# Patient Record
Sex: MALE | Race: WHITE | NOT HISPANIC OR LATINO | Employment: OTHER | URBAN - METROPOLITAN AREA
[De-identification: names, ages, dates, MRNs, and addresses within clinical notes are randomized per-mention and may not be internally consistent; named-entity substitution may affect disease eponyms.]

---

## 2023-11-16 LAB — CRC RECOMMENDATION EXT: NORMAL

## 2024-03-20 ENCOUNTER — OFFICE VISIT (OUTPATIENT)
Dept: FAMILY MEDICINE | Facility: CLINIC | Age: 74
End: 2024-03-20
Payer: MEDICARE

## 2024-03-20 ENCOUNTER — LAB VISIT (OUTPATIENT)
Dept: LAB | Facility: CLINIC | Age: 74
End: 2024-03-20
Payer: MEDICARE

## 2024-03-20 VITALS
BODY MASS INDEX: 28.05 KG/M2 | HEART RATE: 66 BPM | WEIGHT: 225.63 LBS | DIASTOLIC BLOOD PRESSURE: 65 MMHG | SYSTOLIC BLOOD PRESSURE: 122 MMHG | HEIGHT: 75 IN | TEMPERATURE: 98 F | OXYGEN SATURATION: 97 %

## 2024-03-20 DIAGNOSIS — K21.9 GASTROESOPHAGEAL REFLUX DISEASE, UNSPECIFIED WHETHER ESOPHAGITIS PRESENT: ICD-10-CM

## 2024-03-20 DIAGNOSIS — E78.5 DYSLIPIDEMIA: ICD-10-CM

## 2024-03-20 DIAGNOSIS — I10 PRIMARY HYPERTENSION: ICD-10-CM

## 2024-03-20 DIAGNOSIS — E89.0 POSTOPERATIVE HYPOTHYROIDISM: ICD-10-CM

## 2024-03-20 DIAGNOSIS — E89.0 HISTORY OF THYROIDECTOMY: ICD-10-CM

## 2024-03-20 DIAGNOSIS — Z76.89 ENCOUNTER TO ESTABLISH CARE: Primary | ICD-10-CM

## 2024-03-20 DIAGNOSIS — K58.9 IRRITABLE BOWEL SYNDROME, UNSPECIFIED TYPE: ICD-10-CM

## 2024-03-20 DIAGNOSIS — Z76.89 ENCOUNTER TO ESTABLISH CARE: ICD-10-CM

## 2024-03-20 DIAGNOSIS — Z11.59 NEED FOR HEPATITIS C SCREENING TEST: ICD-10-CM

## 2024-03-20 DIAGNOSIS — R79.89 OTHER SPECIFIED ABNORMAL FINDINGS OF BLOOD CHEMISTRY: ICD-10-CM

## 2024-03-20 PROBLEM — Z90.89 HISTORY OF THYROIDECTOMY: Status: ACTIVE | Noted: 2024-03-20

## 2024-03-20 PROBLEM — Z98.890 HISTORY OF THYROIDECTOMY: Status: ACTIVE | Noted: 2024-03-20

## 2024-03-20 LAB
ALBUMIN SERPL BCP-MCNC: 3.9 G/DL (ref 3.5–5.2)
ALP SERPL-CCNC: 65 U/L (ref 55–135)
ALT SERPL W/O P-5'-P-CCNC: 20 U/L (ref 10–44)
ANION GAP SERPL CALC-SCNC: 11 MMOL/L (ref 8–16)
AST SERPL-CCNC: 20 U/L (ref 10–40)
BASOPHILS # BLD AUTO: 0.02 K/UL (ref 0–0.2)
BASOPHILS NFR BLD: 0.4 % (ref 0–1.9)
BILIRUB SERPL-MCNC: 0.5 MG/DL (ref 0.1–1)
BUN SERPL-MCNC: 18 MG/DL (ref 8–23)
CALCIUM SERPL-MCNC: 9.5 MG/DL (ref 8.7–10.5)
CHLORIDE SERPL-SCNC: 105 MMOL/L (ref 95–110)
CHOLEST SERPL-MCNC: 178 MG/DL (ref 120–199)
CHOLEST/HDLC SERPL: 4.6 {RATIO} (ref 2–5)
CO2 SERPL-SCNC: 23 MMOL/L (ref 23–29)
CREAT SERPL-MCNC: 1.3 MG/DL (ref 0.5–1.4)
DIFFERENTIAL METHOD BLD: ABNORMAL
EOSINOPHIL # BLD AUTO: 0.4 K/UL (ref 0–0.5)
EOSINOPHIL NFR BLD: 9.5 % (ref 0–8)
ERYTHROCYTE [DISTWIDTH] IN BLOOD BY AUTOMATED COUNT: 13.6 % (ref 11.5–14.5)
EST. GFR  (NO RACE VARIABLE): 57.3 ML/MIN/1.73 M^2
GLUCOSE SERPL-MCNC: 90 MG/DL (ref 70–110)
HCT VFR BLD AUTO: 38.6 % (ref 40–54)
HCV AB SERPL QL IA: NORMAL
HDLC SERPL-MCNC: 39 MG/DL (ref 40–75)
HDLC SERPL: 21.9 % (ref 20–50)
HGB BLD-MCNC: 13.7 G/DL (ref 14–18)
IMM GRANULOCYTES # BLD AUTO: 0 K/UL (ref 0–0.04)
IMM GRANULOCYTES NFR BLD AUTO: 0 % (ref 0–0.5)
LDLC SERPL CALC-MCNC: 101.4 MG/DL (ref 63–159)
LYMPHOCYTES # BLD AUTO: 0.8 K/UL (ref 1–4.8)
LYMPHOCYTES NFR BLD: 17.3 % (ref 18–48)
MCH RBC QN AUTO: 34.1 PG (ref 27–31)
MCHC RBC AUTO-ENTMCNC: 35.5 G/DL (ref 32–36)
MCV RBC AUTO: 96 FL (ref 82–98)
MONOCYTES # BLD AUTO: 0.5 K/UL (ref 0.3–1)
MONOCYTES NFR BLD: 10 % (ref 4–15)
NEUTROPHILS # BLD AUTO: 2.8 K/UL (ref 1.8–7.7)
NEUTROPHILS NFR BLD: 62.8 % (ref 38–73)
NONHDLC SERPL-MCNC: 139 MG/DL
NRBC BLD-RTO: 0 /100 WBC
PLATELET # BLD AUTO: 175 K/UL (ref 150–450)
PMV BLD AUTO: 10.2 FL (ref 9.2–12.9)
POTASSIUM SERPL-SCNC: 4 MMOL/L (ref 3.5–5.1)
PROT SERPL-MCNC: 7.5 G/DL (ref 6–8.4)
RBC # BLD AUTO: 4.02 M/UL (ref 4.6–6.2)
SODIUM SERPL-SCNC: 139 MMOL/L (ref 136–145)
TRIGL SERPL-MCNC: 188 MG/DL (ref 30–150)
TSH SERPL DL<=0.005 MIU/L-ACNC: 2.89 UIU/ML (ref 0.4–4)
WBC # BLD AUTO: 4.51 K/UL (ref 3.9–12.7)

## 2024-03-20 PROCEDURE — 36415 COLL VENOUS BLD VENIPUNCTURE: CPT | Mod: ,,, | Performed by: STUDENT IN AN ORGANIZED HEALTH CARE EDUCATION/TRAINING PROGRAM

## 2024-03-20 PROCEDURE — 80053 COMPREHEN METABOLIC PANEL: CPT | Performed by: STUDENT IN AN ORGANIZED HEALTH CARE EDUCATION/TRAINING PROGRAM

## 2024-03-20 PROCEDURE — 86803 HEPATITIS C AB TEST: CPT | Performed by: STUDENT IN AN ORGANIZED HEALTH CARE EDUCATION/TRAINING PROGRAM

## 2024-03-20 PROCEDURE — 84443 ASSAY THYROID STIM HORMONE: CPT | Performed by: STUDENT IN AN ORGANIZED HEALTH CARE EDUCATION/TRAINING PROGRAM

## 2024-03-20 PROCEDURE — 85025 COMPLETE CBC W/AUTO DIFF WBC: CPT | Performed by: STUDENT IN AN ORGANIZED HEALTH CARE EDUCATION/TRAINING PROGRAM

## 2024-03-20 PROCEDURE — 80061 LIPID PANEL: CPT | Performed by: STUDENT IN AN ORGANIZED HEALTH CARE EDUCATION/TRAINING PROGRAM

## 2024-03-20 PROCEDURE — 99214 OFFICE O/P EST MOD 30 MIN: CPT | Mod: S$GLB,,, | Performed by: STUDENT IN AN ORGANIZED HEALTH CARE EDUCATION/TRAINING PROGRAM

## 2024-03-20 RX ORDER — ESZOPICLONE 3 MG/1
TABLET, FILM COATED ORAL
COMMUNITY
Start: 2024-02-08 | End: 2024-05-30 | Stop reason: SDUPTHER

## 2024-03-20 RX ORDER — FAMOTIDINE 20 MG/1
20 TABLET, FILM COATED ORAL
COMMUNITY

## 2024-03-20 RX ORDER — OMEPRAZOLE 40 MG/1
CAPSULE, DELAYED RELEASE ORAL
COMMUNITY
Start: 2024-01-09 | End: 2024-03-20

## 2024-03-20 RX ORDER — ACETAMINOPHEN 500 MG
3 TABLET ORAL
COMMUNITY

## 2024-03-20 RX ORDER — OMEPRAZOLE 40 MG/1
40 CAPSULE, DELAYED RELEASE ORAL EVERY MORNING
Qty: 90 CAPSULE | Refills: 1 | Status: SHIPPED | OUTPATIENT
Start: 2024-03-20

## 2024-03-20 RX ORDER — BUSPIRONE HYDROCHLORIDE 10 MG/1
TABLET ORAL
COMMUNITY
Start: 2024-01-23 | End: 2024-03-20

## 2024-03-20 RX ORDER — FLUOROURACIL 50 MG/G
CREAM TOPICAL 2 TIMES DAILY
COMMUNITY
Start: 2023-11-10

## 2024-03-20 RX ORDER — LEVOTHYROXINE SODIUM 125 UG/1
CAPSULE ORAL
COMMUNITY
Start: 2023-08-01

## 2024-03-20 RX ORDER — MONTELUKAST SODIUM 4 MG/1
2 TABLET, CHEWABLE ORAL 2 TIMES DAILY
COMMUNITY
Start: 2024-01-25 | End: 2024-03-20

## 2024-03-20 NOTE — PROGRESS NOTES
"  [facility]    [facility]  [address], [address]  [place] [address]    Subjective     Patient ID: Kieran Trejo is a 73 y.o. male who comes to the clinic to establish care.    Chief Complaint: Establish Care    DLD - on statin    CAD - sees Dr. Keyla dave/ cardiology    History of Prostate Cancer - sees Dr. Tolliver, has a prostate resection in Nov 2022    HTN - on losartan-HCTZ daily    GERD - sees Dr. Ring for this, on pprilosec 40mg daily    IBS - sees Dr. Ring, this is his main complaint currently, still giving him a lot of problems    Hypothyroidism - takes levothyroxine (tirosint) 125mcg    Insomnia - takes Lunesta 10mg nightly    DANYA - on CPAP nightly    ROS negative unless stated above       Objective     Vitals:    03/20/24 0816   BP: 122/65   Pulse: 66   Temp: 97.8 °F (36.6 °C)   TempSrc: Oral   SpO2: 97%   Weight: 102.3 kg (225 lb 9.6 oz)   Height: 6' 3" (1.905 m)        Wt Readings from Last 3 Encounters:   03/20/24 0816 102.3 kg (225 lb 9.6 oz)   07/02/14 0909 103.5 kg (228 lb 1.6 oz)        Physical Exam  Vitals reviewed.   Constitutional:       Appearance: Normal appearance.   HENT:      Head: Normocephalic and atraumatic.   Eyes:      Extraocular Movements: Extraocular movements intact.      Pupils: Pupils are equal, round, and reactive to light.   Cardiovascular:      Rate and Rhythm: Normal rate.      Pulses: Normal pulses.      Heart sounds: Normal heart sounds.   Pulmonary:      Effort: Pulmonary effort is normal. No respiratory distress.      Breath sounds: Normal breath sounds.   Musculoskeletal:         General: Normal range of motion.      Cervical back: Normal range of motion and neck supple.   Skin:     General: Skin is dry.      Capillary Refill: Capillary refill takes less than 2 seconds.   Neurological:      General: No focal deficit present.      Mental Status: He is alert and oriented to person, place, and time. Mental status is at " baseline.   Psychiatric:         Mood and Affect: Mood normal.         Behavior: Behavior normal.         Thought Content: Thought content normal.         Current Outpatient Medications   Medication Instructions    eszopiclone (LUNESTA) 3 mg Tab     famotidine (PEPCID) 20 mg, Oral    fluorouraciL (EFUDEX) 5 % cream Topical (Top), 2 times daily    losartan-hydrochlorothiazide 50-12.5 mg (HYZAAR) 50-12.5 mg per tablet 1 tablet, Oral, Daily    melatonin (MELATIN) 3 mg, Sublingual    MULTIVIT-IRON-MIN-FOLIC ACID 3,500-18-0.4 UNIT-MG-MG ORAL CHEW Oral    omeprazole (PRILOSEC) 40 mg, Oral, Every morning    pravastatin (PRAVACHOL) 40 mg, Oral, Daily    TIROSINT 125 mcg Cap     VITAMIN B COMPLEX ORAL 1 tablet, Oral, Every morning           Assessment and Plan     1. Encounter to establish care  -     Comprehensive metabolic panel; Future; Expected date: 03/20/2024  -     CBC auto differential; Future; Expected date: 03/20/2024    2. Need for hepatitis C screening test  -     Hepatitis C antibody; Future; Expected date: 03/20/2024    3. Postoperative hypothyroidism  -     TSH; Future; Expected date: 03/20/2024    4. Dyslipidemia  -     Lipid panel; Future; Expected date: 03/20/2024    5. Gastroesophageal reflux disease, unspecified whether esophagitis present  -     omeprazole (PRILOSEC) 40 MG capsule; Take 1 capsule (40 mg total) by mouth every morning.  Dispense: 90 capsule; Refill: 1    6. Other specified abnormal findings of blood chemistry  -     CBC auto differential; Future; Expected date: 03/20/2024    7. Irritable bowel syndrome, unspecified type    8. History of thyroidectomy    9. Primary hypertension        Here to establish care    For hypothyroidism, continue levothyroxine 125 mcg and follow up with endocrinology     For hypertension, continue losartan and HCTZ    For dyslipidemia, continue pravastatin    For CAD, continue statin and follow up with Dr. Ramires    For GERD and IBS, continue follow-up with Dr. Ring  but may need different GI doctor in the future as he's still having stomach issues    Obtaining colonoscopy records from [facility] in 3 months         I encouraged the patient to take all medications as prescribed and to keep follow up appointments with their providers. Patient stated they had no other concerns. Questions were invited and answered. Follow up sooner if needed.     Jef Dexter MD  03/20/2024 8:11 AM       no

## 2024-03-21 ENCOUNTER — PATIENT OUTREACH (OUTPATIENT)
Dept: ADMINISTRATIVE | Facility: HOSPITAL | Age: 74
End: 2024-03-21
Payer: MEDICARE

## 2024-03-25 ENCOUNTER — PATIENT OUTREACH (OUTPATIENT)
Dept: ADMINISTRATIVE | Facility: HOSPITAL | Age: 74
End: 2024-03-25
Payer: MEDICARE

## 2024-03-25 NOTE — PROGRESS NOTES
Population Health Chart Review & Patient Outreach Details      Additional [facility] Health Notes:               Updates Requested / Reviewed:      Updated Care Coordination Note         Health Maintenance Topics Overdue:      VBHM Score: 0     Patient is not due for any topics at this time.    Influenza Vaccine  Shingles/Zoster Vaccine  RSV Vaccine                  Health Maintenance Topic(s) Outreach Outcomes & Actions Taken:    Colorectal Cancer Screening - Outreach Outcomes & Actions Taken  : External Records Uploaded, Care Team Updated, & History Updated if Applicable

## 2024-05-30 RX ORDER — ESZOPICLONE 3 MG/1
3 TABLET, FILM COATED ORAL NIGHTLY
Qty: 30 TABLET | Refills: 2 | Status: SHIPPED | OUTPATIENT
Start: 2024-05-30

## 2024-05-30 NOTE — TELEPHONE ENCOUNTER
No care due was identified.  [facility] Embedded Care Due Messages. Reference number: [id number].   5/30/2024 2:39:46 PM CDT

## 2024-05-30 NOTE — TELEPHONE ENCOUNTER
----- Message from Trice Su sent at 5/30/2024  2:27 PM CDT -----  Contact: PT  Type:  RX Refill Request    Who Called: PT  Refill or New Rx:REFILL   RX Name and Strength:eszopiclone (LUNESTA) 3 mg Tab  How is the patient currently taking it? (ex. 1XDay):ONE TAB PER NIGHT   Is this a 30 day or 90 day RX:90  Preferred Pharmacy with phone number:   [id number] - [facility] [address] [facility]  [facility]  [address]  [facility] [address]  Phone: [phone] Fax: [phone]  Local or Mail Order:LOCAL   Ordering Provider:DERIAN  Would the patient rather a call back or a response via [facility]? CALL  Best Call Back Number:[phone] (home)   Additional Information: THANK YOU

## 2024-06-02 ENCOUNTER — NURSE TRIAGE (OUTPATIENT)
Dept: ADMINISTRATIVE | Facility: CLINIC | Age: 74
End: 2024-06-02
Payer: MEDICARE

## 2024-06-02 NOTE — TELEPHONE ENCOUNTER
[place]    PCP:  Dr. Jef Dexter    Pt reports that he took 2 Covid home tests and both were +.  C/O severe body aches, chills, low-grade temp to fever (98.2), productive cough with clear phlegm with occasional mucus, HA, stiff neck (can touch his chin to his chest), and PND.  He reports just returned from a [place].  Denies SOB and CP.  H/O HTN, Hypothyroidism S/P Thyroidectomy, GERD, and IBS.  Per protocol, care advised is call PCP within 24 hrs or TM within 24 hrs.  Advised call PCP tomorrow/VV/UCC/ED.  Pt reports that today is his 5th day of symptoms so he needs to be seen today.  Pt states no UCC so he'd have to go to the ED.  Advised he can do VV with [facility] today.  Pt agreeable.  [facility] instruction provided and assisted pt with registering for [facility] VV.  [facility] states no Provider available at this time.  Advised pt to check back later or go to UCC/ED - NT did advise pt of UCC open now near him and hours provided.  Pt VU.  Advised to call for worsening/questions/concerns.  VU.    Reason for Disposition   [1] HIGH RISK patient (e.g., weak immune system, age > 64 years, obesity with BMI 30 or higher, pregnant, chronic lung disease or other chronic medical condition) AND [2] COVID symptoms (e.g., cough, fever)  (Exceptions: Already seen by PCP and no new or worsening symptoms.)    Additional Information   Negative: SEVERE difficulty breathing (e.g., struggling for each breath, speaks in single words)   Negative: Difficult to awaken or acting confused (e.g., disoriented, slurred speech)   Negative: Bluish (or gray) lips or face now   Negative: Shock suspected (e.g., cold/pale/clammy skin, too weak to stand, low BP, rapid pulse)   Negative: Sounds like a life-threatening emergency to the triager   Negative: SEVERE or constant chest pain or pressure  (Exception: Mild central chest pain, present only when coughing.)   Negative: MODERATE difficulty breathing (e.g., speaks in phrases, SOB even at rest,  pulse 100-120)   Negative: [1] Headache AND [2] stiff neck (can't touch chin to chest)   Negative: Chest pain or pressure  (Exception: MILD central chest pain, present only when coughing.)   Negative: [1] Drinking very little AND [2] dehydration suspected (e.g., no urine > 12 hours, very dry mouth, very lightheaded)   Negative: Patient sounds very sick or weak to the triager   Negative: MILD difficulty breathing (e.g., minimal/no SOB at rest, SOB with walking, pulse <100)   Negative: Fever > 103 F (39.4 C)   Negative: [1] Fever > 101 F (38.3 C) AND [2] age > 60 years   Negative: [1] Fever > 100.0 F (37.8 C) AND [2] bedridden (e.g., CVA, chronic illness, recovering from surgery)    Protocols used: Coronavirus (COVID-19) Diagnosed or [facility]

## 2024-06-04 NOTE — TELEPHONE ENCOUNTER
Called patient to follow up with him on how he's feeling he is taking his tx for Covid a 5 day tx I scheduled him a f/u for Monday Keysha 10 @ 800am w/ Dr Dexter patient agreed and I told him if he has any concerns to please reach out to us .

## 2024-06-07 ENCOUNTER — PATIENT MESSAGE (OUTPATIENT)
Dept: FAMILY MEDICINE | Facility: CLINIC | Age: 74
End: 2024-06-07
Payer: MEDICARE

## 2024-06-10 ENCOUNTER — OFFICE VISIT (OUTPATIENT)
Dept: FAMILY MEDICINE | Facility: CLINIC | Age: 74
End: 2024-06-10
Payer: MEDICARE

## 2024-06-10 VITALS
HEART RATE: 57 BPM | WEIGHT: 217.88 LBS | HEIGHT: 75 IN | DIASTOLIC BLOOD PRESSURE: 70 MMHG | SYSTOLIC BLOOD PRESSURE: 104 MMHG | BODY MASS INDEX: 27.09 KG/M2 | OXYGEN SATURATION: 99 %

## 2024-06-10 DIAGNOSIS — E78.5 DYSLIPIDEMIA: ICD-10-CM

## 2024-06-10 DIAGNOSIS — U07.1 COVID-19: ICD-10-CM

## 2024-06-10 DIAGNOSIS — I10 PRIMARY HYPERTENSION: Primary | ICD-10-CM

## 2024-06-10 PROCEDURE — 99214 OFFICE O/P EST MOD 30 MIN: CPT | Mod: S$GLB,,, | Performed by: STUDENT IN AN ORGANIZED HEALTH CARE EDUCATION/TRAINING PROGRAM

## 2024-06-10 RX ORDER — BUSPIRONE HYDROCHLORIDE 10 MG/1
20 TABLET ORAL 2 TIMES DAILY
COMMUNITY
Start: 2024-01-23 | End: 2024-10-19

## 2024-06-10 RX ORDER — HYDROGEN PEROXIDE 3 %
40 SOLUTION, NON-ORAL MISCELLANEOUS DAILY
COMMUNITY

## 2024-06-10 NOTE — PROGRESS NOTES
"  [facility]  [address], [address]  [place] [address]    Subjective     Patient ID: Kieran Trejo is a 73 y.o. male who comes to the clinic for an acute visit.    Chief Complaint: Follow-up    Covid positive - tested positive for covid about 8 days ago. Had a virtual visit w/ a doctor a few days ago, was prescribed paxlovid. Had SE's such as headache. Finished his course but didn't take the last day. Feeling better now, no SOB.     ROS negative unless stated above       Objective     Vitals:    06/10/24 0814   BP: 104/70   BP Location: Left arm   Patient Position: Sitting   BP Method: Large (Manual)   Pulse: (!) 57   SpO2: 99%   Weight: 98.8 kg (217 lb 14.4 oz)   Height: 6' 3" (1.905 m)       Wt Readings from Last 3 Encounters:   06/10/24 0814 98.8 kg (217 lb 14.4 oz)   03/20/24 0816 102.3 kg (225 lb 9.6 oz)   07/02/14 0909 103.5 kg (228 lb 1.6 oz)        Physical Exam  Vitals reviewed.   Constitutional:       Appearance: Normal appearance.   HENT:      Head: Normocephalic and atraumatic.   Eyes:      Extraocular Movements: Extraocular movements intact.      Pupils: Pupils are equal, round, and reactive to light.   Cardiovascular:      Rate and Rhythm: Normal rate.      Pulses: Normal pulses.      Heart sounds: Normal heart sounds.   Pulmonary:      Effort: Pulmonary effort is normal. No respiratory distress.      Breath sounds: Normal breath sounds.   Musculoskeletal:         General: Normal range of motion.      Cervical back: Normal range of motion and neck supple.   Skin:     General: Skin is dry.      Capillary Refill: Capillary refill takes less than 2 seconds.   Neurological:      General: No focal deficit present.      Mental Status: He is alert and oriented to person, place, and time. Mental status is at baseline.   Psychiatric:         Mood and Affect: Mood normal.         Behavior: Behavior normal.         Thought Content: Thought content " normal.         Current Outpatient Medications   Medication Instructions    busPIRone (BUSPAR) 20 mg, Oral, 2 times daily    esomeprazole (NEXIUM) 40 mg, Oral, Daily    eszopiclone (LUNESTA) 3 mg, Oral, Nightly    famotidine (PEPCID) 20 mg, Oral    fluorouraciL (EFUDEX) 5 % cream Topical (Top), 2 times daily    losartan-hydrochlorothiazide 50-12.5 mg (HYZAAR) 50-12.5 mg per tablet 1 tablet, Oral, Daily    melatonin (MELATIN) 3 mg, Sublingual    MULTIVIT-IRON-MIN-FOLIC ACID 3,500-18-0.4 UNIT-MG-MG ORAL CHEW Oral    omeprazole (PRILOSEC) 40 mg, Oral, Every morning    pravastatin (PRAVACHOL) 40 mg, Oral, Daily    TIROSINT 125 mcg Cap     VITAMIN B COMPLEX ORAL 1 tablet, Oral, Every morning           Assessment and Plan     1. Primary hypertension    2. Dyslipidemia    3. COVID-19      Here for an acute visit    For HTN, continue BP regimen as above    For DLD, continue statin, at goal    For covid 19, he's about 14 days out, finished paxlovid. Was told to f/u w/ his PCP to make sure he's doing better. He is. Continue multivitamin w/ immune support, will let me know if symptoms return    RTC in 6 months         I encouraged the patient to take all medications as prescribed and to keep follow up appointments with their providers. Patient stated they had no other concerns. Questions were invited and answered. Follow up sooner if need. ED precautions given.    Jef Dexter MD  06/10/2024 8:18 AM

## 2024-06-12 ENCOUNTER — TELEPHONE (OUTPATIENT)
Dept: FAMILY MEDICINE | Facility: CLINIC | Age: 74
End: 2024-06-12
Payer: MEDICARE

## 2024-06-12 DIAGNOSIS — I10 PRIMARY HYPERTENSION: Primary | ICD-10-CM

## 2024-06-12 RX ORDER — LOSARTAN POTASSIUM AND HYDROCHLOROTHIAZIDE 12.5; 5 MG/1; MG/1
1 TABLET ORAL DAILY
Qty: 90 TABLET | Refills: 3 | Status: SHIPPED | OUTPATIENT
Start: 2024-06-12

## 2024-06-12 NOTE — TELEPHONE ENCOUNTER
----- Message from Jagdeep Uribe MA sent at 6/12/2024 10:08 AM CDT -----  Contact: PT  Patient Requesting medication Refill please Advice.  ----- Message -----  From: Trice Su  Sent: 6/12/2024   9:57 AM CDT  To: Derian Fuchs Staff    Type:  RX Refill Request    Who Called: PT  Refill or New Rx: NEW  RX Name and Strength: losartan-hydrochlorothiazide 50-12.5 mg (HYZAAR) 50-12.5 mg per tablet  How is the patient currently taking it? (ex. 1XDay):ONE TAB PER DAY  Is this a 30 day or 90 day RX:90  Preferred Pharmacy with phone number:   [id number] [place], [place] - [address] [facility]  [facility]  [address]  [facility] [address]  Phone: [phone] Fax: [phone]  Local or Mail Order:LOCAL  Ordering Provider:DERIAN  Would the patient rather a call back or a response via [facility]? CALL   Best Call Back Number:[phone] (home)   Additional Information: THANK YOU

## 2024-06-13 ENCOUNTER — TELEPHONE (OUTPATIENT)
Dept: FAMILY MEDICINE | Facility: CLINIC | Age: 74
End: 2024-06-13

## 2024-06-13 ENCOUNTER — OFFICE VISIT (OUTPATIENT)
Dept: FAMILY MEDICINE | Facility: CLINIC | Age: 74
End: 2024-06-13
Payer: MEDICARE

## 2024-06-13 DIAGNOSIS — B96.89 BACTERIAL SINUSITIS: Primary | ICD-10-CM

## 2024-06-13 DIAGNOSIS — J32.9 BACTERIAL SINUSITIS: Primary | ICD-10-CM

## 2024-06-13 DIAGNOSIS — U07.1 COVID-19: ICD-10-CM

## 2024-06-13 PROCEDURE — 99214 OFFICE O/P EST MOD 30 MIN: CPT | Mod: S$GLB,,, | Performed by: STUDENT IN AN ORGANIZED HEALTH CARE EDUCATION/TRAINING PROGRAM

## 2024-06-13 RX ORDER — AMOXICILLIN 500 MG/1
500 TABLET, FILM COATED ORAL EVERY 12 HOURS
Qty: 14 TABLET | Refills: 0 | Status: SHIPPED | OUTPATIENT
Start: 2024-06-13 | End: 2024-06-20

## 2024-06-13 RX ORDER — PREDNISONE 20 MG/1
20 TABLET ORAL DAILY
Qty: 5 TABLET | Refills: 0 | Status: SHIPPED | OUTPATIENT
Start: 2024-06-13

## 2024-06-13 NOTE — PROGRESS NOTES
"  [facility]  [address]  [place] [address]    Subjective     Patient ID: Kieran Trejo is a 73 y.o. male who comes to the clinic for an acute visit.    Chief Complaint: Follow-up (Pt is +Covid)    Covid positive - symptoms started about 17 days, I just saw him this week for the same thing. Symptoms have progressed throughout the week. Main thing is fatigue and sinus congestion. No SOB or fever    ROS negative unless stated above       Objective     Vitals:    06/13/24 1631   BP: (P) 130/74   Pulse: (P) 68   SpO2: (P) 97%   Weight: (P) 98 kg (216 lb)   Height: (P) 6' 3" (1.905 m)       Wt Readings from Last 3 Encounters:   06/13/24 1631 (P) 98 kg (216 lb)   06/10/24 0814 98.8 kg (217 lb 14.4 oz)   03/20/24 0816 102.3 kg (225 lb 9.6 oz)        Physical Exam  Vitals reviewed.   Constitutional:       Appearance: Normal appearance.   HENT:      Head: Normocephalic and atraumatic.   Eyes:      Extraocular Movements: Extraocular movements intact.      Pupils: Pupils are equal, round, and reactive to light.   Cardiovascular:      Rate and Rhythm: Normal rate.      Pulses: Normal pulses.      Heart sounds: Normal heart sounds.   Pulmonary:      Effort: Pulmonary effort is normal. No respiratory distress.      Breath sounds: Normal breath sounds.   Musculoskeletal:         General: Normal range of motion.      Cervical back: Normal range of motion and neck supple.   Skin:     General: Skin is dry.      Capillary Refill: Capillary refill takes less than 2 seconds.   Neurological:      General: No focal deficit present.      Mental Status: He is alert and oriented to person, place, and time. Mental status is at baseline.   Psychiatric:         Mood and Affect: Mood normal.         Behavior: Behavior normal.         Thought Content: Thought content normal.         Current Outpatient Medications   Medication Instructions    busPIRone (BUSPAR) 20 mg, " Oral, 2 times daily    esomeprazole (NEXIUM) 40 mg, Oral, Daily    eszopiclone (LUNESTA) 3 mg, Oral, Nightly    famotidine (PEPCID) 20 mg, Oral    fluorouraciL (EFUDEX) 5 % cream Topical (Top), 2 times daily    losartan-hydrochlorothiazide 50-12.5 mg (HYZAAR) 50-12.5 mg per tablet 1 tablet, Oral, Daily    melatonin (MELATIN) 3 mg, Sublingual    MULTIVIT-IRON-MIN-FOLIC ACID 3,500-18-0.4 UNIT-MG-MG ORAL CHEW Oral    omeprazole (PRILOSEC) 40 mg, Oral, Every morning    pravastatin (PRAVACHOL) 40 mg, Oral, Daily    TIROSINT 125 mcg Cap     VITAMIN B COMPLEX ORAL 1 tablet, Oral, Every morning           Assessment and Plan     1. Bacterial sinusitis    2. COVID-19        Here for an acute visit    Could have a bacterial superficial infection given worsening symptoms. Will treat w/ amoxil, went over SE's. Treating w/ prednisone 20mg daily, went over SE's    RTC as needed         I encouraged the patient to take all medications as prescribed and to keep follow up appointments with their providers. Patient stated they had no other concerns. Questions were invited and answered. Follow up sooner if need. ED precautions given.    Jef Dexter MD  06/13/2024 4:36 PM

## 2024-06-13 NOTE — TELEPHONE ENCOUNTER
----- Message from Madonna Rangel sent at 6/13/2024 10:50 AM CDT -----  Contact: PT  Type: Needs Medical Advice    Who Called: PT  Best Call Back Number: [phone]  Additional  Information:  PT calling to report he tested positive for Covid, today. Still coming in for 4;30 appt today, told pt he would need to wear a mask. Pt verbalized understanding  Please Advise- Thank you

## 2024-07-16 ENCOUNTER — TELEPHONE (OUTPATIENT)
Dept: FAMILY MEDICINE | Facility: CLINIC | Age: 74
End: 2024-07-16
Payer: MEDICARE

## 2024-07-16 NOTE — TELEPHONE ENCOUNTER
----- Message from Tim Parson sent at 7/16/2024 10:19 AM CDT -----  Regarding: clearnace  Type:  Needs Medical Advice    Who Called: Pt    Would the patient rather a call back or a response via [facility]? Call back    Best Call Back Number: [phone]      Additional Information: Sts Dr Santos faxed over clearance for a procedure yesterday and wants to know if it was received and what he needs to do to get it cleared so he can have his procedure.   Please advise -- Thank you

## 2024-07-22 ENCOUNTER — TELEPHONE (OUTPATIENT)
Dept: FAMILY MEDICINE | Facility: CLINIC | Age: 74
End: 2024-07-22
Payer: MEDICARE

## 2024-07-22 NOTE — TELEPHONE ENCOUNTER
Duplicate message spoke with patient scheduled appointment with Dr. Dexter for 0900 7/23/2023.  ----- Message from Delia Mahmood sent at 7/22/2024 11:08 AM CDT -----  Contact: Patient  Type:  Patient Returning Call    Who Called:Patient     Who Left Message for Patient:Christiana    Does the patient know what this is regarding?:Yes    Would the patient rather a call back or a response via [facility]? Call    Best Call Back Number: [phone]    Additional Information: Patient states that the wrong number was called for him and is requesting a call to his cell

## 2024-07-22 NOTE — TELEPHONE ENCOUNTER
----- Message from Delia Ferreira MA sent at 7/22/2024 10:16 AM CDT -----  Contact: Patient  Please advise  ----- Message -----  From: Delia Mahmood  Sent: 7/22/2024  10:14 AM CDT  To: Isacc Fuchs Staff    Type:  Patient Returning Call    Who Called:Patient     Who Left Message for Patient:Christiana    Does the patient know what this is regarding?:Yes    Would the patient rather a call back or a response via [facility]? Call    Best Call Back Number:   [phone]    Additional Information: Please return call

## 2024-07-22 NOTE — TELEPHONE ENCOUNTER
Dra Dexter and Dr. Dexter staff,  Patient is calling to see if pre-op forms were received for this patient from Dr. Santos's office for scheduled cataract surgery scheduled for 8/6/2024. This patient is schedule for pre-op for tomorrow 7/23/2024 @ 0900 AM.  Please review and advise.  Thank you.  Signed:  Pedro Cao LPN  ----- Message from Trice Su sent at 7/22/2024 10:55 AM CDT -----  Contact: PT  Type:  Patient Returning Call    Who Called:PT   Who Left Message for Patient:PEDRO  Does the patient know what this is regarding?:APPT - PRE OP   Would the patient rather a call back or a response via [facility]? CALL   Best Call Back Number: [phone] (home)  Additional Information: THANK YOU

## 2024-07-22 NOTE — TELEPHONE ENCOUNTER
----- Message from Trice Su sent at 7/22/2024  8:58 AM CDT -----  Contact: PT  Type:  Needs Medical Advice    Who Called: PT   Symptoms (please be specific): PLEASE CALL TO DISCUSS  IF A CLEARANCE LETTER IS NEEDED FOR A   08/06/24 PROCEDURE  How long has patient had these symptoms:  N/A  Pharmacy name and phone #:  N/A   Would the patient rather a call back or a response via [facility]? CALL   Best Call Back Number: [phone] (home)   Additional Information: THANK YOU

## 2024-07-23 ENCOUNTER — OFFICE VISIT (OUTPATIENT)
Dept: FAMILY MEDICINE | Facility: CLINIC | Age: 74
End: 2024-07-23
Payer: MEDICARE

## 2024-07-23 VITALS
OXYGEN SATURATION: 99 % | DIASTOLIC BLOOD PRESSURE: 70 MMHG | HEART RATE: 56 BPM | HEIGHT: 75 IN | SYSTOLIC BLOOD PRESSURE: 134 MMHG | BODY MASS INDEX: 26.81 KG/M2 | WEIGHT: 215.63 LBS

## 2024-07-23 DIAGNOSIS — Z01.818 PREOP EXAMINATION: Primary | ICD-10-CM

## 2024-07-23 PROCEDURE — G2211 COMPLEX E/M VISIT ADD ON: HCPCS | Mod: S$GLB,,, | Performed by: STUDENT IN AN ORGANIZED HEALTH CARE EDUCATION/TRAINING PROGRAM

## 2024-07-23 PROCEDURE — 99214 OFFICE O/P EST MOD 30 MIN: CPT | Mod: S$GLB,,, | Performed by: STUDENT IN AN ORGANIZED HEALTH CARE EDUCATION/TRAINING PROGRAM

## 2024-07-23 PROCEDURE — 93005 ELECTROCARDIOGRAM TRACING: CPT | Mod: S$GLB,,, | Performed by: STUDENT IN AN ORGANIZED HEALTH CARE EDUCATION/TRAINING PROGRAM

## 2024-07-23 NOTE — LETTER
July 23, 2024    Kieran Trejo  [address] [place] [address] [place] [address]             [facility]  [address] [address]  Phone: [phone] To Whom It May Concern,    Patient has been informed of the risks of surgery (including infection, bleeding, blood clots/PE, stroke, heart attack, dependence of ventilator, and even death). Patient understands these risks and would like to proceed w/ surgery.        If you have any questions or concerns, please don't hesitate to call.    Sincerely,        Jef Dexter MD

## 2024-07-23 NOTE — PROGRESS NOTES
"  [facility]  [address], [address]  [place] [address]    Subjective     Patient ID: Kieran Trejo is a 73 y.o. male who comes to the clinic for an acute visit.    Chief Complaint: Cataract (Patient is here for a letter for Dr. Santos for cataract surgery on 08/06/2024)    Patient needs preop clearance for cataract surgery. Currently no SOB or CP. No recent respirator infection. Planned for August 6th w/ left eye.     ROS negative unless stated above       Objective     Vitals:    07/23/24 0901   BP: 134/70   Pulse: (!) 56   SpO2: 99%   Weight: 97.8 kg (215 lb 9.6 oz)   Height: 6' 3" (1.905 m)       Wt Readings from Last 3 Encounters:   07/23/24 0901 97.8 kg (215 lb 9.6 oz)   06/13/24 1631 (P) 98 kg (216 lb)   06/10/24 0814 98.8 kg (217 lb 14.4 oz)        Physical Exam  Vitals reviewed.   Constitutional:       Appearance: Normal appearance.   HENT:      Head: Normocephalic and atraumatic.   Eyes:      Extraocular Movements: Extraocular movements intact.      Pupils: Pupils are equal, round, and reactive to light.   Cardiovascular:      Rate and Rhythm: Normal rate.      Pulses: Normal pulses.      Heart sounds: Normal heart sounds.   Pulmonary:      Effort: Pulmonary effort is normal. No respiratory distress.      Breath sounds: Normal breath sounds.   Musculoskeletal:         General: Normal range of motion.      Cervical back: Normal range of motion and neck supple.   Skin:     General: Skin is dry.      Capillary Refill: Capillary refill takes less than 2 seconds.   Neurological:      General: No focal deficit present.      Mental Status: He is alert and oriented to person, place, and time. Mental status is at baseline.   Psychiatric:         Mood and Affect: Mood normal.         Behavior: Behavior normal.         Thought Content: Thought content normal.         Current Outpatient Medications   Medication Instructions    busPIRone (BUSPAR) 20 mg, " Oral, 2 times daily    esomeprazole (NEXIUM) 40 mg, Oral, Daily    eszopiclone (LUNESTA) 3 mg, Oral, Nightly    famotidine (PEPCID) 20 mg, Oral    losartan-hydrochlorothiazide 50-12.5 mg (HYZAAR) 50-12.5 mg per tablet 1 tablet, Oral, Daily    melatonin (MELATIN) 3 mg, Sublingual    MULTIVIT-IRON-MIN-FOLIC ACID 3,500-18-0.4 UNIT-MG-MG ORAL CHEW Oral    omeprazole (PRILOSEC) 40 mg, Oral, Every morning    pravastatin (PRAVACHOL) 40 mg, Oral, Daily    TIROSINT 125 mcg Cap     VITAMIN B COMPLEX ORAL 1 tablet, Oral, Every morning           Assessment and Plan     1. Preop examination  -     IN OFFICE EKG 12-LEAD (to Muse)      Here for an acute visit    Patient was told the risks of surgery which includes infection, blood clots, bleeding, heart attack, stroke, and even death among others. She understands the risks and would like to proceed w/ surgery.    RTC at end of year         I encouraged the patient to take all medications as prescribed and to keep follow up appointments with their providers. Patient stated they had no other concerns. Questions were invited and answered. Follow up sooner if need. ED precautions given.    Jef Dexter MD  07/23/2024 9:04 AM

## 2024-07-24 LAB
OHS QRS DURATION: 118 MS
OHS QTC CALCULATION: 404 MS

## 2024-12-05 DIAGNOSIS — G47.00 INSOMNIA, UNSPECIFIED TYPE: Primary | ICD-10-CM

## 2024-12-05 RX ORDER — ESZOPICLONE 3 MG/1
3 TABLET, FILM COATED ORAL NIGHTLY
Qty: 30 TABLET | Refills: 2 | Status: SHIPPED | OUTPATIENT
Start: 2024-12-05

## 2024-12-05 NOTE — TELEPHONE ENCOUNTER
Refill Routing Note   Medication(s) are not appropriate for processing by [facility] for the following reason(s):        Outside of protocol    [facility] action(s):  Route             Appointments  past 12m or future 3m with PCP    Date Provider   Last Visit   7/23/2024 Jef Dexter MD   Next Visit   12/10/2024 Jef Dexter MD   ED visits in past 90 days: 0        Note composed:1:02 PM 12/05/2024

## 2024-12-05 NOTE — TELEPHONE ENCOUNTER
No care due was identified.  [facility] Embedded Care Due Messages. Reference number: [id number].   12/05/2024 12:29:16 PM CST

## 2024-12-10 ENCOUNTER — OFFICE VISIT (OUTPATIENT)
Dept: FAMILY MEDICINE | Facility: CLINIC | Age: 74
End: 2024-12-10
Payer: MEDICARE

## 2024-12-10 VITALS
WEIGHT: 218 LBS | RESPIRATION RATE: 18 BRPM | HEART RATE: 66 BPM | SYSTOLIC BLOOD PRESSURE: 120 MMHG | OXYGEN SATURATION: 96 % | BODY MASS INDEX: 27.1 KG/M2 | HEIGHT: 75 IN | DIASTOLIC BLOOD PRESSURE: 70 MMHG

## 2024-12-10 DIAGNOSIS — E78.2 MIXED HYPERLIPIDEMIA: ICD-10-CM

## 2024-12-10 DIAGNOSIS — E89.0 POSTOPERATIVE HYPOTHYROIDISM: ICD-10-CM

## 2024-12-10 DIAGNOSIS — I10 PRIMARY HYPERTENSION: Primary | ICD-10-CM

## 2024-12-10 PROCEDURE — 99214 OFFICE O/P EST MOD 30 MIN: CPT | Mod: S$GLB,,, | Performed by: STUDENT IN AN ORGANIZED HEALTH CARE EDUCATION/TRAINING PROGRAM

## 2024-12-10 PROCEDURE — G2211 COMPLEX E/M VISIT ADD ON: HCPCS | Mod: S$GLB,,, | Performed by: STUDENT IN AN ORGANIZED HEALTH CARE EDUCATION/TRAINING PROGRAM

## 2024-12-10 NOTE — PROGRESS NOTES
"  [facility]    [facility]  [address]  [place] [address]    Subjective     Patient ID: Kieran Trejo is a 74 y.o. male who comes to the clinic for a follow up visit.    Chief Complaint: Follow-up (6 mos f/u)    DLD - on statin     CAD - sees Dr. Ramires w/ cardiology    History of Prostate Cancer - sees Dr. Tolliver, has a prostate resection in Nov 2022    HTN - on losartan-HCTZ daily    GERD - sees Dr. Ring for this, on pprilosec 40mg daily     IBS - sees Dr. 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 They think it could be his back    Basal Cell Skin Cancer - s/p removal from his right arm in 2024    ROS negative unless stated above       Objective     Vitals:    12/10/24 1535   BP: 120/70   BP Location: Left arm   Patient Position: Sitting   Pulse: 66   Resp: 18   SpO2: 96%   Weight: 98.9 kg (218 lb)   Height: 6' 3" (1.905 m)        Wt Readings from Last 3 Encounters:   12/10/24 1535 98.9 kg (218 lb)   07/23/24 0901 97.8 kg (215 lb 9.6 oz)   06/13/24 1631 (P) 98 kg (216 lb)        Physical Exam  Constitutional:       General: He is not in acute distress.     Appearance: Normal appearance. He is not ill-appearing.   HENT:      Head: Normocephalic and atraumatic.      Mouth/Throat:      Mouth: Mucous membranes are moist.   Eyes:      Extraocular Movements: Extraocular movements intact.      Pupils: Pupils are equal, round, and reactive to light.   Cardiovascular:      Rate and Rhythm: Normal rate.   Pulmonary:      Effort: Pulmonary effort is normal. No respiratory distress.   Musculoskeletal:         General: Normal range of motion.      Cervical back: Normal range of motion and neck supple. "   Skin:     General: Skin is warm and dry.   Neurological:      General: No focal deficit present.      Mental Status: He is alert and oriented to person, place, and time. Mental status is at baseline.   Psychiatric:         Mood and Affect: Mood normal.         Behavior: Behavior normal.         Thought Content: Thought content normal.         Current Outpatient Medications   Medication Instructions    eszopiclone (LUNESTA) 3 mg, Oral, Nightly    famotidine (PEPCID) 20 mg    losartan-hydrochlorothiazide 50-12.5 mg (HYZAAR) 50-12.5 mg per tablet 1 tablet, Oral, Daily    melatonin (MELATIN) 3 mg    MULTIVIT-IRON-MIN-FOLIC ACID 3,500-18-0.4 UNIT-MG-MG ORAL CHEW Take by mouth.    omeprazole (PRILOSEC) 40 mg, Oral, Every morning    pravastatin (PRAVACHOL) 40 mg, Daily    TIROSINT 125 mcg Cap     VITAMIN B COMPLEX ORAL 1 tablet, Every morning           Assessment and Plan     1. Primary hypertension  -     Basic metabolic panel; Future; Expected date: 12/10/2024  -     CBC auto differential; Future; Expected date: 12/10/2024    2. Postoperative hypothyroidism  -     TSH; Future; Expected date: 12/10/2024    3. Mixed hyperlipidemia  -     Lipid panel; Future; Expected date: 12/10/2024        Here for follow up.    For hypothyroidism, continue levothyroxine 125 mcg and follow up with endocrinology      For hypertension, continue losartan and HCTZ     For dyslipidemia, continue pravastatin     For CAD, continue statin and follow up with Dr. Ramires    The visit today included increased complexity associated with the care of an episodic problem, namely HTN, that was addressed and managed via longitudinal care.    RTC in 6 months        I encouraged the patient to take all medications as prescribed and to keep follow up appointments with their providers. ED precautions given for more concerning issues. Questions were invited and answered. Patient stated they had no other concerns. Follow up sooner if needed.     Jef  MD Isacc  12/10/2024 3:42 PM

## 2024-12-11 ENCOUNTER — LAB VISIT (OUTPATIENT)
Dept: LAB | Facility: CLINIC | Age: 74
End: 2024-12-11
Payer: MEDICARE

## 2024-12-11 DIAGNOSIS — E89.0 POSTOPERATIVE HYPOTHYROIDISM: ICD-10-CM

## 2024-12-11 DIAGNOSIS — E78.2 MIXED HYPERLIPIDEMIA: ICD-10-CM

## 2024-12-11 DIAGNOSIS — I10 PRIMARY HYPERTENSION: ICD-10-CM

## 2024-12-11 LAB
ANION GAP SERPL CALC-SCNC: 10 MMOL/L (ref 8–16)
BASOPHILS # BLD AUTO: 0.02 K/UL (ref 0–0.2)
BASOPHILS NFR BLD: 0.4 % (ref 0–1.9)
BUN SERPL-MCNC: 19 MG/DL (ref 8–23)
CALCIUM SERPL-MCNC: 9.3 MG/DL (ref 8.7–10.5)
CHLORIDE SERPL-SCNC: 109 MMOL/L (ref 95–110)
CHOLEST SERPL-MCNC: 175 MG/DL (ref 120–199)
CHOLEST/HDLC SERPL: 4.3 {RATIO} (ref 2–5)
CO2 SERPL-SCNC: 23 MMOL/L (ref 23–29)
CREAT SERPL-MCNC: 1.2 MG/DL (ref 0.5–1.4)
DIFFERENTIAL METHOD BLD: ABNORMAL
EOSINOPHIL # BLD AUTO: 0.5 K/UL (ref 0–0.5)
EOSINOPHIL NFR BLD: 9.9 % (ref 0–8)
ERYTHROCYTE [DISTWIDTH] IN BLOOD BY AUTOMATED COUNT: 13.7 % (ref 11.5–14.5)
EST. GFR  (NO RACE VARIABLE): >60 ML/MIN/1.73 M^2
GLUCOSE SERPL-MCNC: 117 MG/DL (ref 70–110)
HCT VFR BLD AUTO: 39.1 % (ref 40–54)
HDLC SERPL-MCNC: 41 MG/DL (ref 40–75)
HDLC SERPL: 23.4 % (ref 20–50)
HGB BLD-MCNC: 13.7 G/DL (ref 14–18)
IMM GRANULOCYTES # BLD AUTO: 0.01 K/UL (ref 0–0.04)
IMM GRANULOCYTES NFR BLD AUTO: 0.2 % (ref 0–0.5)
LDLC SERPL CALC-MCNC: 111 MG/DL (ref 63–159)
LYMPHOCYTES # BLD AUTO: 1.1 K/UL (ref 1–4.8)
LYMPHOCYTES NFR BLD: 20.9 % (ref 18–48)
MCH RBC QN AUTO: 33.7 PG (ref 27–31)
MCHC RBC AUTO-ENTMCNC: 35 G/DL (ref 32–36)
MCV RBC AUTO: 96 FL (ref 82–98)
MONOCYTES # BLD AUTO: 0.5 K/UL (ref 0.3–1)
MONOCYTES NFR BLD: 8.5 % (ref 4–15)
NEUTROPHILS # BLD AUTO: 3.2 K/UL (ref 1.8–7.7)
NEUTROPHILS NFR BLD: 60.1 % (ref 38–73)
NONHDLC SERPL-MCNC: 134 MG/DL
NRBC BLD-RTO: 0 /100 WBC
PLATELET # BLD AUTO: 171 K/UL (ref 150–450)
PMV BLD AUTO: 10 FL (ref 9.2–12.9)
POTASSIUM SERPL-SCNC: 4.1 MMOL/L (ref 3.5–5.1)
RBC # BLD AUTO: 4.06 M/UL (ref 4.6–6.2)
SODIUM SERPL-SCNC: 142 MMOL/L (ref 136–145)
TRIGL SERPL-MCNC: 115 MG/DL (ref 30–150)
TSH SERPL DL<=0.005 MIU/L-ACNC: 3.02 UIU/ML (ref 0.4–4)
WBC # BLD AUTO: 5.27 K/UL (ref 3.9–12.7)

## 2024-12-11 PROCEDURE — 84443 ASSAY THYROID STIM HORMONE: CPT | Performed by: STUDENT IN AN ORGANIZED HEALTH CARE EDUCATION/TRAINING PROGRAM

## 2024-12-11 PROCEDURE — 80048 BASIC METABOLIC PNL TOTAL CA: CPT | Performed by: STUDENT IN AN ORGANIZED HEALTH CARE EDUCATION/TRAINING PROGRAM

## 2024-12-11 PROCEDURE — 36415 COLL VENOUS BLD VENIPUNCTURE: CPT | Mod: ,,, | Performed by: STUDENT IN AN ORGANIZED HEALTH CARE EDUCATION/TRAINING PROGRAM

## 2024-12-11 PROCEDURE — 85025 COMPLETE CBC W/AUTO DIFF WBC: CPT | Performed by: STUDENT IN AN ORGANIZED HEALTH CARE EDUCATION/TRAINING PROGRAM

## 2024-12-11 PROCEDURE — 80061 LIPID PANEL: CPT | Performed by: STUDENT IN AN ORGANIZED HEALTH CARE EDUCATION/TRAINING PROGRAM

## 2024-12-17 ENCOUNTER — PATIENT MESSAGE (OUTPATIENT)
Dept: FAMILY MEDICINE | Facility: CLINIC | Age: 74
End: 2024-12-17
Payer: MEDICARE

## 2025-01-07 RX ORDER — DICYCLOMINE HYDROCHLORIDE 20 MG/1
20 TABLET ORAL 3 TIMES DAILY
COMMUNITY
Start: 2024-08-07 | End: 2025-01-07 | Stop reason: SDUPTHER

## 2025-01-07 RX ORDER — DICYCLOMINE HYDROCHLORIDE 20 MG/1
20 TABLET ORAL 3 TIMES DAILY
Qty: 90 TABLET | Refills: 1 | Status: SHIPPED | OUTPATIENT
Start: 2025-01-07

## 2025-01-07 NOTE — TELEPHONE ENCOUNTER
----- Message from Netechy sent at 1/7/2025 10:37 AM CST -----  Type:  RX Refill Request    Who Called:the patient  Refill or New Rx: new  RX Name and Strength:Bentyl Dicyclomine  How is the patient currently taking it? (ex. 1XDay):as rx'd  Is this a 30 day or 90 day RX:90  Preferred Pharmacy with phone number:  [facility] - [address] [address]  Phone: [phone] Fax: [phone]  Local or Mail Order:mail  Ordering Provider:same  Would the patient rather a call back or a response via [facility]? Call/  Best Call Back Number:[phone]   Additional Information: Pt is out of rx  Please call pt  Thanks

## 2025-01-07 NOTE — TELEPHONE ENCOUNTER
Refill Routing Note   Medication(s) are not appropriate for processing by [facility] for the following reason(s):        Outside of protocol    [facility] action(s):  Route               Appointments  past 12m or future 3m with PCP    Date Provider   Last Visit   12/10/2024 Jef Dexter MD   Next Visit   6/10/2025 Jef Dexter MD   ED visits in past 90 days: 0        Note composed:11:48 AM 01/07/2025

## 2025-01-07 NOTE — TELEPHONE ENCOUNTER
No care due was identified.  [facility] Embedded Care Due Messages. Reference number: [id number].   1/07/2025 11:18:53 AM CST

## 2025-03-14 ENCOUNTER — PATIENT MESSAGE (OUTPATIENT)
Dept: FAMILY MEDICINE | Facility: CLINIC | Age: 75
End: 2025-03-14
Payer: MEDICARE

## 2025-03-14 DIAGNOSIS — G47.00 INSOMNIA, UNSPECIFIED TYPE: ICD-10-CM

## 2025-03-14 RX ORDER — ESZOPICLONE 3 MG/1
3 TABLET, FILM COATED ORAL NIGHTLY
Qty: 30 TABLET | Refills: 2 | OUTPATIENT
Start: 2025-03-14

## 2025-03-17 DIAGNOSIS — G47.00 INSOMNIA, UNSPECIFIED TYPE: ICD-10-CM

## 2025-03-17 RX ORDER — ESZOPICLONE 3 MG/1
3 TABLET, FILM COATED ORAL NIGHTLY
Qty: 30 TABLET | Refills: 0 | Status: SHIPPED | OUTPATIENT
Start: 2025-03-17 | End: 2025-03-18 | Stop reason: SDUPTHER

## 2025-03-18 ENCOUNTER — OFFICE VISIT (OUTPATIENT)
Dept: FAMILY MEDICINE | Facility: CLINIC | Age: 75
End: 2025-03-18
Payer: MEDICARE

## 2025-03-18 VITALS
HEART RATE: 60 BPM | OXYGEN SATURATION: 98 % | BODY MASS INDEX: 27.23 KG/M2 | SYSTOLIC BLOOD PRESSURE: 116 MMHG | DIASTOLIC BLOOD PRESSURE: 70 MMHG | WEIGHT: 219 LBS | HEIGHT: 75 IN | RESPIRATION RATE: 18 BRPM

## 2025-03-18 DIAGNOSIS — G47.00 INSOMNIA, UNSPECIFIED TYPE: ICD-10-CM

## 2025-03-18 DIAGNOSIS — I10 PRIMARY HYPERTENSION: Primary | ICD-10-CM

## 2025-03-18 DIAGNOSIS — E78.2 MIXED HYPERLIPIDEMIA: ICD-10-CM

## 2025-03-18 PROCEDURE — G2211 COMPLEX E/M VISIT ADD ON: HCPCS | Mod: S$GLB,,, | Performed by: STUDENT IN AN ORGANIZED HEALTH CARE EDUCATION/TRAINING PROGRAM

## 2025-03-18 PROCEDURE — 99214 OFFICE O/P EST MOD 30 MIN: CPT | Mod: S$GLB,,, | Performed by: STUDENT IN AN ORGANIZED HEALTH CARE EDUCATION/TRAINING PROGRAM

## 2025-03-18 RX ORDER — ESZOPICLONE 3 MG/1
3 TABLET, FILM COATED ORAL NIGHTLY
Qty: 90 TABLET | Refills: 0 | Status: SHIPPED | OUTPATIENT
Start: 2025-04-07

## 2025-03-18 NOTE — PROGRESS NOTES
"  [facility]    [facility]  [address]  [place] [address]    Subjective     Patient ID: Kieran Trejo is a 74 y.o. male who comes to the clinic for a follow up visit.    Chief Complaint: Managment of chronic condition (Follow up)    DLD - on statin     CAD - sees Dr. Ramires w/ cardiology     History of Prostate Cancer - sees Dr. Tolliver, has a prostate resection in Nov 2022     HTN - on losartan-HCTZ daily     GERD - sees Dr. Ring for this, on pprilosec 40mg daily     IBS - sees Dr. 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 They think it could be his back     Basal Cell Skin Cancer - s/p removal from his right arm in 2024    ROS negative unless stated above       Objective     Vitals:    03/18/25 1012   BP: 116/70   BP Location: Right arm   Patient Position: Sitting   Pulse: 60   Resp: 18   SpO2: 98%   Weight: 99.3 kg (219 lb)   Height: 6' 3" (1.905 m)        Wt Readings from Last 3 Encounters:   03/18/25 1012 99.3 kg (219 lb)   12/10/24 1535 98.9 kg (218 lb)   07/23/24 0901 97.8 kg (215 lb 9.6 oz)        Physical Exam  Constitutional:       General: He is not in acute distress.     Appearance: Normal appearance. He is not ill-appearing.   HENT:      Head: Normocephalic and atraumatic.      Mouth/Throat:      Mouth: Mucous membranes are moist.   Eyes:      Extraocular Movements: Extraocular movements intact.      Pupils: Pupils are equal, round, and reactive to light.   Cardiovascular:      Rate and Rhythm: Normal rate.   Pulmonary:      Effort: Pulmonary effort is normal. No respiratory distress.   Musculoskeletal:         General: Normal range of motion.      Cervical back: Normal range of " motion and neck supple.   Skin:     General: Skin is warm and dry.   Neurological:      General: No focal deficit present.      Mental Status: He is alert and oriented to person, place, and time. Mental status is at baseline.   Psychiatric:         Mood and Affect: Mood normal.         Behavior: Behavior normal.         Thought Content: Thought content normal.         Current Outpatient Medications   Medication Instructions    dicyclomine (BENTYL) 20 mg, Oral, 3 times daily    [START ON 4/7/2025] eszopiclone (LUNESTA) 3 mg, Oral, Nightly    famotidine (PEPCID) 20 mg    losartan-hydrochlorothiazide 50-12.5 mg (HYZAAR) 50-12.5 mg per tablet 1 tablet, Oral, Daily    melatonin (MELATIN) 3 mg    MULTIVIT-IRON-MIN-FOLIC ACID 3,500-18-0.4 UNIT-MG-MG ORAL CHEW Take by mouth.    omeprazole (PRILOSEC) 40 mg, Oral, Every morning    pravastatin (PRAVACHOL) 40 mg, Daily    TIROSINT 125 mcg Cap     VITAMIN B COMPLEX ORAL 1 tablet, Every morning           Assessment and Plan     1. Primary hypertension    2. Insomnia, unspecified type  -     eszopiclone (LUNESTA) 3 mg Tab; Take 1 tablet (3 mg total) by mouth every evening.  Dispense: 90 tablet; Refill: 0    3. Mixed hyperlipidemia        Here for follow up.    For HTN, at goal, continue regimen as above    For HLD, continue statin    For insomnia, giving 90 tablets of luensta at one time to decrease the amount of time at the pharmacy    The visit today included increased complexity associated with the care of an episodic problem, namely HTN, that was addressed and managed via longitudinal care.    RTC in 4 months, lunesta        I encouraged the patient to take all medications as prescribed and to keep follow up appointments with their providers. ED precautions given for more concerning issues. Questions were invited and answered. Patient stated they had no other concerns. Follow up sooner if needed.     Jef Dexter MD  03/18/2025 10:10 AM

## 2025-03-21 RX ORDER — DICYCLOMINE HYDROCHLORIDE 20 MG/1
TABLET ORAL
Refills: 0 | OUTPATIENT
Start: 2025-03-21

## 2025-03-21 NOTE — TELEPHONE ENCOUNTER
No care due was identified.  [facility] Embedded Care Due Messages. Reference number: [id number].   3/21/2025 12:03:53 PM CDT

## 2025-03-21 NOTE — TELEPHONE ENCOUNTER
Refill Decision Note   Kieran Trejo  is requesting a refill authorization.  Brief Assessment and Rationale for Refill:  Quick Discontinue     Medication Therapy Plan:  no sig request Pharmacy is requesting new scripts for the following medications without required information, (sig/ frequency/qty/etc)      Medication Reconciliation Completed: No     Comments: Pharmacies have been requesting medications for patients without required information, (sig, frequency, qty, etc.). In addition, requests are sent for medication(s) pt. are currently not taking, and medications patients have never taken.    We have spoken to the pharmacies about these request types and advised their teams previously that we are unable to assess these New Script requests and require all details for these requests. This is a known issue and has been reported.     Note composed:12:36 PM 03/21/2025

## 2025-03-24 NOTE — TELEPHONE ENCOUNTER
Last office visit: 3/18/2025  ----- Message from Alma sent at 3/24/2025 10:24 AM CDT -----  Type:  RX Refill RequestWho Called:  pt Refill or New Rx:  refillRX Name and Strength:  dicyclomine (BENTYL) 20 mg tabletHow is the patient currently taking it? (ex. 1XDay):  Is this a 30 day or 90 day RX:  Preferred Pharmacy with phone number:  [facility] [address] or Mail Order:  MailOrdering Provider:  Dr Womack Call Back Number:  [phone] Information:  pt is requesting a call back regarding the approval for med

## 2025-03-24 NOTE — TELEPHONE ENCOUNTER
No care due was identified.  [facility] Embedded Care Due Messages. Reference number: [id number].   3/24/2025 10:37:07 AM CDT

## 2025-03-24 NOTE — TELEPHONE ENCOUNTER
Refill Routing Note   Medication(s) are not appropriate for processing by [facility] for the following reason(s):        Outside of protocol  Other: pharmacy change     [facility] action(s):  Route             Appointments  past 12m or future 3m with PCP    Date Provider   Last Visit   3/18/2025 Jef Dexter MD   Next Visit   6/10/2025 Jef Dexter MD   ED visits in past 90 days: 0        Note composed:5:43 PM 03/24/2025

## 2025-03-25 RX ORDER — DICYCLOMINE HYDROCHLORIDE 20 MG/1
20 TABLET ORAL 3 TIMES DAILY
Qty: 90 TABLET | Refills: 1 | Status: SHIPPED | OUTPATIENT
Start: 2025-03-25

## 2025-03-31 RX ORDER — PRAVASTATIN SODIUM 40 MG/1
40 TABLET ORAL DAILY
Qty: 90 TABLET | Refills: 1 | Status: SHIPPED | OUTPATIENT
Start: 2025-03-31

## 2025-03-31 NOTE — TELEPHONE ENCOUNTER
Refill Routing Note   Medication(s) are not appropriate for processing by [facility] for the following reason(s):        Required labs outdated    [facility] action(s):  Defer             Appointments  past 12m or future 3m with PCP    Date Provider   Last Visit   3/18/2025 Jef Dexetr MD   Next Visit   6/10/2025 Jef Dexter MD   ED visits in past 90 days: 0        Note composed:12:24 PM 03/31/2025

## 2025-03-31 NOTE — TELEPHONE ENCOUNTER
No care due was identified.  [facility] Embedded Care Due Messages. Reference number: [id number].   3/31/2025 11:14:17 AM CDT

## 2025-03-31 NOTE — TELEPHONE ENCOUNTER
Last office visit: 3/18/2025  ----- Message from Patterson sent at 3/31/2025 11:04 AM CDT -----  Contact: pt [phone]  Type:  RX Refill RequestWho Called: ptRefill or New Rx:refillRX Name and Strength:pravastatin (PRAVACHOL) 40 MG tabletHow is the patient currently taking it? (ex. 1XDay):1xdayIs this a 30 day or 90 day RX: 90 dayPreferred Pharmacy with phone number:[id number] - [place], [place] - [address] [facility]  [address] [facility] [address]Jrbhy: [phone] Fax: [phone] or Mail Order:local Ordering Provider:Petty the patient rather a call back or a response via [facility]? Call backBest Call Back Number:[phone] Information: pt would like to refill this medication and sent to [facility] if possible thank you so much !

## 2025-06-10 ENCOUNTER — HOSPITAL ENCOUNTER (OUTPATIENT)
Dept: RADIOLOGY | Facility: HOSPITAL | Age: 75
Discharge: HOME | End: 2025-06-10
Attending: STUDENT IN AN ORGANIZED HEALTH CARE EDUCATION/TRAINING PROGRAM
Payer: MEDICARE

## 2025-06-10 ENCOUNTER — OFFICE VISIT (OUTPATIENT)
Dept: FAMILY MEDICINE | Facility: CLINIC | Age: 75
End: 2025-06-10
Payer: MEDICARE

## 2025-06-10 VITALS
OXYGEN SATURATION: 98 % | DIASTOLIC BLOOD PRESSURE: 68 MMHG | BODY MASS INDEX: 26.45 KG/M2 | SYSTOLIC BLOOD PRESSURE: 116 MMHG | RESPIRATION RATE: 18 BRPM | HEIGHT: 75 IN | HEART RATE: 57 BPM | WEIGHT: 212.75 LBS

## 2025-06-10 DIAGNOSIS — G47.00 INSOMNIA, UNSPECIFIED TYPE: Primary | ICD-10-CM

## 2025-06-10 DIAGNOSIS — C61 MALIGNANT NEOPLASM OF PROSTATE: ICD-10-CM

## 2025-06-10 DIAGNOSIS — R60.0 LOCALIZED EDEMA: ICD-10-CM

## 2025-06-10 DIAGNOSIS — I82.501 CHRONIC THROMBOEMBOLISM OF DEEP VEIN OF RIGHT LOWER EXTREMITY: ICD-10-CM

## 2025-06-10 DIAGNOSIS — M79.89 LEG SWELLING: ICD-10-CM

## 2025-06-10 DIAGNOSIS — I10 PRIMARY HYPERTENSION: ICD-10-CM

## 2025-06-10 PROCEDURE — 99214 OFFICE O/P EST MOD 30 MIN: CPT | Mod: S$GLB,,, | Performed by: STUDENT IN AN ORGANIZED HEALTH CARE EDUCATION/TRAINING PROGRAM

## 2025-06-10 PROCEDURE — 93971 EXTREMITY STUDY: CPT | Mod: TC,RT

## 2025-06-10 PROCEDURE — G2211 COMPLEX E/M VISIT ADD ON: HCPCS | Mod: S$GLB,,, | Performed by: STUDENT IN AN ORGANIZED HEALTH CARE EDUCATION/TRAINING PROGRAM

## 2025-06-10 RX ORDER — ESZOPICLONE 3 MG/1
3 TABLET, FILM COATED ORAL NIGHTLY
Qty: 90 TABLET | Refills: 0 | Status: SHIPPED | OUTPATIENT
Start: 2025-06-10

## 2025-06-10 NOTE — PROGRESS NOTES
"  [facility]  [address]  [place] [address]    Subjective     Patient ID: Kieran Trejo is a 74 y.o. male who comes to the clinic for a follow up visit.    Chief Complaint: Health Maintenance (4 mos follow up)    Insomnia - takes Lunesta 3mg nightly     ROS negative unless stated above       Objective     Vitals:    06/10/25 0923   BP: 116/68   BP Location: Left arm   Patient Position: Sitting   Pulse: (!) 57   Resp: 18   SpO2: 98%   Weight: 96.5 kg (212 lb 11.9 oz)   Height: 6' 3" (1.905 m)        Wt Readings from Last 3 Encounters:   06/10/25 0923 96.5 kg (212 lb 11.9 oz)   03/18/25 1012 99.3 kg (219 lb)   12/10/24 1535 98.9 kg (218 lb)        Physical Exam  Constitutional:       General: He is not in acute distress.     Appearance: Normal appearance. He is not ill-appearing.   HENT:      Head: Normocephalic and atraumatic.      Mouth/Throat:      Mouth: Mucous membranes are moist.   Eyes:      Extraocular Movements: Extraocular movements intact.      Pupils: Pupils are equal, round, and reactive to light.   Cardiovascular:      Rate and Rhythm: Normal rate.   Pulmonary:      Effort: Pulmonary effort is normal. No respiratory distress.   Musculoskeletal:         General: Swelling present. Normal range of motion.      Cervical back: Normal range of motion and neck supple.      Right lower leg: Edema present.   Skin:     General: Skin is warm and dry.   Neurological:      General: No focal deficit present.      Mental Status: He is alert and oriented to person, place, and time. Mental status is at baseline.   Psychiatric:         Mood and Affect: Mood normal.         Behavior: Behavior normal.         Thought Content: Thought content normal.         Current Outpatient Medications   Medication Instructions    dicyclomine (BENTYL) 20 mg, Oral, 3 times daily    eszopiclone (LUNESTA) 3 mg, Oral, Nightly    famotidine (PEPCID) 20 mg    " "losartan-hydrochlorothiazide 50-12.5 mg (HYZAAR) 50-12.5 mg per tablet 1 tablet, Oral, Daily    melatonin (MELATIN) 3 mg    MULTIVIT-IRON-MIN-FOLIC ACID 3,500-18-0.4 UNIT-MG-MG ORAL CHEW Take by mouth.    omeprazole (PRILOSEC) 40 mg, Oral, Every morning    pravastatin (PRAVACHOL) 40 mg, Oral, Daily    TIROSINT 125 mcg Cap     VITAMIN B COMPLEX ORAL 1 tablet, Every morning           Assessment and Plan     1. Insomnia, unspecified type  -     eszopiclone (LUNESTA) 3 mg Tab; Take 1 tablet (3 mg total) by mouth every evening.  Dispense: 90 tablet; Refill: 0    2. Primary hypertension    3. Leg swelling  -     US Lower Extremity Veins Right; Future; Expected date: 06/10/2025    4. Malignant neoplasm of prostate    5. 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 Localized edema  -     US Lower Extremity Veins Right; Future; Expected date: 06/10/2025      Here for follow up.    For insomnia, continue lunesta    For HTN, at goal, continue losartan-HCTZ    For prostate cancer, still present, PSA still low, sees Dr. Ornelas    For Chronic DVT, this was diagnosed in 2024 but the unilateral leg swelling was there for legs, apparently it was never treated, he was told it was because it was "so old". We will reorder a DVT ultrasound to evaluate    The visit today included increased complexity associated with the care of an episodic problem, namely HTN, that was addressed and managed via longitudinal care.    RTC in 3 months, lunesta        I encouraged the patient to take all medications as prescribed and to keep follow up appointments with their providers. ED precautions given for more concerning issues. Questions were invited and answered. Patient stated they had no other concerns. Follow up sooner if needed.     Jef Dexter MD  06/10/2025 9:28 AM      "

## 2025-06-11 ENCOUNTER — RESULTS FOLLOW-UP (OUTPATIENT)
Dept: FAMILY MEDICINE | Facility: CLINIC | Age: 75
End: 2025-06-11

## 2025-06-23 DIAGNOSIS — Z00.00 ENCOUNTER FOR MEDICARE ANNUAL WELLNESS EXAM: ICD-10-CM

## 2025-06-26 DIAGNOSIS — I10 PRIMARY HYPERTENSION: ICD-10-CM

## 2025-06-26 RX ORDER — LOSARTAN POTASSIUM AND HYDROCHLOROTHIAZIDE 12.5; 5 MG/1; MG/1
1 TABLET ORAL DAILY
Qty: 90 TABLET | Refills: 1 | Status: SHIPPED | OUTPATIENT
Start: 2025-06-26

## 2025-06-26 NOTE — TELEPHONE ENCOUNTER
Refill Routing Note   Medication(s) are not appropriate for processing by [facility] for the following reason(s):        Outside of protocol    [facility] action(s):  Route  Approve   Requires labs : Yes - CMP            Appointments  past 12m or future 3m with PCP    Date Provider   Last Visit   6/10/2025 Jef Dexter MD   Next Visit   Visit date not found Jef Dexter MD   ED visits in past 90 days: 0        Note composed:4:44 PM 06/26/2025

## 2025-06-26 NOTE — TELEPHONE ENCOUNTER
Last office visit: 6/10/25  Copied from CRM #[id number]. Topic: Medications - Medication Refill  >> Jun 26, 2025  1:51 PM Alberta wrote:  Type:  RX Refill Request    Who Called:  Patient   Refill or New Rx:  refill  RX Name and Strength: dicyclomine (BENTYL) 20 mg tablet    How is the patient currently taking it? (ex. 1XDay):    Is this a 30 day or 90 day RX:    Preferred Pharmacy with phone number: [facility] [address]   Local or Mail Order: mail order   Ordering Provider:  Dr. Isacc Joshua Call Back Number:    Additional Information:   Patient is requesting a refill on med..

## 2025-06-26 NOTE — TELEPHONE ENCOUNTER
Refill Routing Note   Medication(s) are not appropriate for processing by [facility] for the following reason(s):        Outside of protocol    [facility] action(s):  Route     Requires labs : Yes             Appointments  past 12m or future 3m with PCP    Date Provider   Last Visit   6/10/2025 Jef Dexter MD   Next Visit   Visit date not found Jef Dexter MD   ED visits in past 90 days: 0        Note composed:2:19 PM 06/26/2025

## 2025-06-26 NOTE — TELEPHONE ENCOUNTER
Care Due:                  Date            Visit Type   Department     Provider  --------------------------------------------------------------------------------                                EP -                              PRIMARY      [facility]  Last Visit: 06-      CARE ([facility])   MEDICINE       Jef Dexter  Next Visit: None Scheduled  None         None Found                                                            Last  Test          Frequency    Reason                     Performed    Due Date  --------------------------------------------------------------------------------    CMP.........  12 months..  pravastatin..............  03-   03-    [facility] Embedded Care Due Messages. Reference number: [id number].   6/26/2025 2:12:25 PM CDT

## 2025-06-26 NOTE — TELEPHONE ENCOUNTER
Last office visit: 6/10/25  Copied from CRM #[id number]. Topic: Medications - Medication Refill  >> Jun 26, 2025  3:35 PM Martha wrote:  Type:  RX Refill Request    Who Called:  Patient  Refill or New Rx:  New Rx  RX Name and Strength:  losartan-hydrochlorothiazide 50-12.5 mg (HYZAAR) 50-12.5 mg per tablet  How is the patient currently taking it? (ex. 1XDay):  AS Directed  Is this a 30 day or 90 day RX:  90  Preferred Pharmacy with phone number:    [id number] - [place], [place] - [address] [facility]  & [address]  [address]  [facility] [address]  Phone: [phone] Fax: [phone]  Local or Mail Order:  Local   Ordering Provider:  Dr Isacc Joshua Call Back Number:  [phone]  Additional Information:  Pt stated he forgot to request this medication when he called earlier for his other meds. Thank You

## 2025-06-27 RX ORDER — DICYCLOMINE HYDROCHLORIDE 20 MG/1
20 TABLET ORAL 3 TIMES DAILY
Qty: 90 TABLET | Refills: 1 | Status: SHIPPED | OUTPATIENT
Start: 2025-06-27